# Patient Record
(demographics unavailable — no encounter records)

---

## 2025-01-04 NOTE — DISCUSSION/SUMMARY
[Medication Risks Reviewed] : Medication risks reviewed [de-identified] : reviewed the case and the imaging with the patient  lower back pain with lumbar left radiculopathy  discussion of the condition and treatment options cautions discussed questions answered discussion of natural history of the condition and what the next step would be MRi/PT  MDP fu to review the MRi  not able to return to work

## 2025-01-04 NOTE — HISTORY OF PRESENT ILLNESS
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 Patient reports while picking up electronics he felt a pull in his lower back, went to [facility] the following day but received no imaging, received an injection which provided minimal/no relief. able to sleep - hurts in all positoins   Pain radiates down LLE to the left heel, patient denies numbness. RLE not as bad - Pain associated with prolonged sitting.   xrays today: L spine -  ap PELVIS-   HTN No hx of cancer   NO chiro/accupuncture/PT   No prior back surgery No loss of bb control   OOW since the accident

## 2025-01-04 NOTE — WORK
[Total (100%)] : total (100%) [Does not reveal pre-existing condition(s) that may affect treatment/prognosis] : does not reveal pre-existing condition(s) that may affect treatment/prognosis [Cannot return to work because ________] : cannot return to work because [unfilled] [Unknown at this time] : : unknown at this time [Patient] : patient [Less than Sedentary Work:] :  Less than Sedentary Work: Unable to meet the requirement of Sedentary Work.

## 2025-01-18 NOTE — HISTORY OF PRESENT ILLNESS
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 Patient reports while picking up electronics he felt a pull in his lower back, went to [facility] the following day but received no imaging, received an injection which provided minimal/no relief. able to sleep - hurts in all positoins  Pain radiates down LLE to the left heel, patient denies numbness. RLE not as bad - Pain associated with prolonged sitting.  xrays today: L spine - ap PELVIS-  HTN No hx of cancer  NO chiro/accupuncture/PT   No prior back surgery No loss of bb control  OOW since the accident  1/18/25: Here for fu - plan at last was "MRi/PT MDP fu to review the MRi not able to return to work"  overall doing a bit better just got started with PT MDP helped a bit  MRi L spine - see report - [facility] by my read - left sided L5-S1 LDH with nerve compression

## 2025-01-18 NOTE — HISTORY OF PRESENT ILLNESS
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 Patient reports while picking up electronics he felt a pull in his lower back, went to [facility] the following day but received no imaging, received an injection which provided minimal/no relief. able to sleep - hurts in all positoins  Pain radiates down LLE to the left heel, patient denies numbness. RLE not as bad - Pain associated with prolonged sitting.  xrays today: L spine - ap PELVIS-  HTN No hx of cancer  NO chiro/accupuncture/PT   No prior back surgery No loss of bb control  OOW since the accident  1/18/25: Here for fu - plan at last was "MRi/PT MDP fu to review the MRi not able to return to work"  overall doing a bit better just got started with PT MDP helped a bit  MRi L spine - see report - [facility] by my read - left sided L5-S1 LDH with nerve compression

## 2025-01-18 NOTE — WORK
[Total (100%)] : total (100%) [Does not reveal pre-existing condition(s) that may affect treatment/prognosis] : does not reveal pre-existing condition(s) that may affect treatment/prognosis [Unknown at this time] : : unknown at this time [Patient] : patient [Less than Sedentary Work:] :  Less than Sedentary Work: Unable to meet the requirement of Sedentary Work. [Can return to work without limitations on ______] : can return to work without limitations on [unfilled] [Can return to work with limitations on ______] : can return to work with limitations on [unfilled]

## 2025-01-18 NOTE — DISCUSSION/SUMMARY
[Medication Risks Reviewed] : Medication risks reviewed [Surgical risks reviewed] : Surgical risks reviewed [de-identified] : Medication risks reviewed. Surgical risks reviewed. reviewed the case and the imaging with the patient lower back pain with lumbar left radiculopathy - its the left L5-S1 disc that is the issue discussion of the condition and treatment options cautions discussed questions answered discussion of natural history of the condition and what the next step would be PT fu in 4 weeks if not getting better consider LESI at L5-S1 if nothing working consider left sided L5-S1 decomrpession right now PT hes going to return to work 1/22.

## 2025-01-18 NOTE — HISTORY OF PRESENT ILLNESS
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 Patient reports while picking up electronics he felt a pull in his lower back, went to [facility] the following day but received no imaging, received an injection which provided minimal/no relief. able to sleep - hurts in all positoins  Pain radiates down LLE to the left heel, patient denies numbness. RLE not as bad - Pain associated with prolonged sitting.  xrays today: L spine - ap PELVIS-  HTN No hx of cancer  NO chiro/accupuncture/PT   No prior back surgery No loss of bb control  OOW since the accident  1/18/25: Here for fu - plan at last was "MRi/PT MDP fu to review the MRi not able to return to work"  overall doing a bit better just got started with PT MDP helped a bit  MRi L spine - see report - [facility] by my read - left sided L5-S1 LDH with nerve compression

## 2025-01-18 NOTE — DISCUSSION/SUMMARY
[Medication Risks Reviewed] : Medication risks reviewed [Surgical risks reviewed] : Surgical risks reviewed [de-identified] : Medication risks reviewed. Surgical risks reviewed. reviewed the case and the imaging with the patient lower back pain with lumbar left radiculopathy - its the left L5-S1 disc that is the issue discussion of the condition and treatment options cautions discussed questions answered discussion of natural history of the condition and what the next step would be PT fu in 4 weeks if not getting better consider LESI at L5-S1 if nothing working consider left sided L5-S1 decomrpession right now PT hes going to return to work 1/22.

## 2025-03-07 NOTE — WORK
[Total (100%)] : total (100%) [Does not reveal pre-existing condition(s) that may affect treatment/prognosis] : does not reveal pre-existing condition(s) that may affect treatment/prognosis [Can return to work without limitations on ______] : can return to work without limitations on [unfilled] [Unknown at this time] : : unknown at this time [Patient] : patient [Less than Sedentary Work:] :  Less than Sedentary Work: Unable to meet the requirement of Sedentary Work.

## 2025-03-08 NOTE — DISCUSSION/SUMMARY
[Medication Risks Reviewed] : Medication risks reviewed [Surgical risks reviewed] : Surgical risks reviewed [de-identified] : reviewed the case and the imaging with the patient  lower back pain with lumbar left radiculopathy - its the left L5-S1 disc that is the issue  discussion of the condition and treatment options cautions discussed questions answered if not getting better consider LESI at L5-S1  if nothing working consider left sided L5-S1 decompression will  hold off now as feeling better Home exercise program  Follow up 6-8 weeks

## 2025-03-08 NOTE — DISCUSSION/SUMMARY
[Medication Risks Reviewed] : Medication risks reviewed [Surgical risks reviewed] : Surgical risks reviewed [de-identified] : reviewed the case and the imaging with the patient  lower back pain with lumbar left radiculopathy - its the left L5-S1 disc that is the issue  discussion of the condition and treatment options cautions discussed questions answered if not getting better consider LESI at L5-S1  if nothing working consider left sided L5-S1 decompression will  hold off now as feeling better Home exercise program  Follow up 6-8 weeks

## 2025-03-08 NOTE — HISTORY OF PRESENT ILLNESS
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 Patient reports while picking up electronics he felt a pull in his lower back, went to [facility] the following day but received no imaging, received an injection which provided minimal/no relief. able to sleep - hurts in all positoins   Pain radiates down LLE to the left heel, patient denies numbness. RLE not as bad - Pain associated with prolonged sitting.   xrays today: L spine -  ap PELVIS-   HTN No hx of cancer   NO chiro/accupuncture/PT   No prior back surgery No loss of bb control   OOW since the accident   1/18/25: Here for fu - plan at last was "MRi/PT  MDP fu to review the MRi  not able to return to work"  overall doing a bit better  just got started with PT  MDP helped a bit   MRi L spine - see report - [facility]  by my read - left sided L5-S1 LDH with nerve compression   3/7/25 pt is here for wcfu of the lower back today. States pain levels have gown down but has stiffness. Has returned to work, without issue. No longer in PT. States pain down the leg is no longer constant, only intermittent.

## 2025-03-08 NOTE — HISTORY OF PRESENT ILLNESS
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 Patient reports while picking up electronics he felt a pull in his lower back, went to [facility] the following day but received no imaging, received an injection which provided minimal/no relief. able to sleep - hurts in all positoins   Pain radiates down LLE to the left heel, patient denies numbness. RLE not as bad - Pain associated with prolonged sitting.   xrays today: L spine -  ap PELVIS-   HTN No hx of cancer   NO chiro/accupuncture/PT   No prior back surgery No loss of bb control   OOW since the accident   1/18/25: Here for fu - plan at last was "MRi/PT  MDP fu to review the MRi  not able to return to work"  overall doing a bit better  just got started with PT  MDP helped a bit   MRi L spine - see report - [facility]  by my read - left sided L5-S1 LDH with nerve compression   3/7/25 pt is here for wcfu of the lower back today. States pain levels have gown down but has stiffness. Has returned to work, without issue. No longer in PT. States pain down the leg is no longer constant, only intermittent.